# Patient Record
Sex: FEMALE | Race: WHITE | NOT HISPANIC OR LATINO | Employment: FULL TIME | URBAN - METROPOLITAN AREA
[De-identification: names, ages, dates, MRNs, and addresses within clinical notes are randomized per-mention and may not be internally consistent; named-entity substitution may affect disease eponyms.]

---
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
## 2025-02-13 ENCOUNTER — PHARMACY IMMUNIZATION REVIEW (OUTPATIENT)
Dept: PHARMACY | Facility: HOSPITAL | Age: 20
End: 2025-02-13
Payer: COMMERCIAL

## 2025-02-13 NOTE — PROGRESS NOTES
Caller: Annita Johnson    Relationship: Self    Best call back number: [phone]     What is the best time to reach you: ASAP    Who are you requesting to speak with (clinical staff, provider,  specific staff member):     What was the call regarding: PT SAID SHE WAS WORKING WITH CHRISTIANA ON THE [employer] APPLICATION.  SHE SAID SHE HAS NOT RECEIVED AN EMAIL BACK ON THAT YET LIKE CHRISTIANA MENTIONED SHE SHOULD BE RECEIVING.  PLEASE CALL PT TO ADVISE.       I have reviewed the notes, assessments, and/or procedures performed by Shira Howell, pharmacy technician, I concur with her documentation of Eladia Khan.

## 2025-02-13 NOTE — PROGRESS NOTES
[facility] Vaccination Administration    Patient reported to [facility] via referral on 2/13/2025    Allergies:    Patient has no known allergies.    Vaccine History:   Immunization History   Administered Date(s) Administered    COVID-19 (PFIZER) Purple Cap Monovalent 07/23/2021, 08/16/2021    Tdap 02/13/2025       Plan:    The following vaccines were administered today:  Tdap (patient is pregnant)    Maria Elena Howell  2/13/2025  11:56 EST

## 2025-04-01 LAB — EXTERNAL GROUP B STREP ANTIGEN: POSITIVE

## 2025-04-18 ENCOUNTER — HOSPITAL ENCOUNTER (INPATIENT)
Facility: HOSPITAL | Age: 20
LOS: 4 days | Discharge: HOME | End: 2025-04-22
Attending: OBSTETRICS & GYNECOLOGY | Admitting: OBSTETRICS & GYNECOLOGY
Payer: COMMERCIAL

## 2025-04-18 ENCOUNTER — HOSPITAL ENCOUNTER (OUTPATIENT)
Dept: LABOR AND DELIVERY | Facility: HOSPITAL | Age: 20
Discharge: HOME | End: 2025-04-18
Payer: COMMERCIAL

## 2025-04-18 PROBLEM — Z34.90 PREGNANCY: Status: ACTIVE | Noted: 2025-04-18

## 2025-04-18 LAB
ABO GROUP BLD: NORMAL
BASOPHILS # BLD AUTO: 0.02 10*3/MM3 (ref 0–0.2)
BASOPHILS NFR BLD AUTO: 0.2 % (ref 0–1.5)
BLD GP AB SCN SERPL QL: NEGATIVE
DEPRECATED RDW RBC AUTO: 44.1 FL (ref 37–54)
EOSINOPHIL # BLD AUTO: 0.03 10*3/MM3 (ref 0–0.4)
EOSINOPHIL NFR BLD AUTO: 0.2 % (ref 0.3–6.2)
ERYTHROCYTE [DISTWIDTH] IN BLOOD BY AUTOMATED COUNT: 12.7 % (ref 12.3–15.4)
HCT VFR BLD AUTO: 30.6 % (ref 34–46.6)
HGB BLD-MCNC: 10.4 G/DL (ref 12–15.9)
IMM GRANULOCYTES # BLD AUTO: 0.07 10*3/MM3 (ref 0–0.05)
IMM GRANULOCYTES NFR BLD AUTO: 0.6 % (ref 0–0.5)
LYMPHOCYTES # BLD AUTO: 1.93 10*3/MM3 (ref 0.7–3.1)
LYMPHOCYTES NFR BLD AUTO: 15.9 % (ref 19.6–45.3)
MCH RBC QN AUTO: 33.1 PG (ref 26.6–33)
MCHC RBC AUTO-ENTMCNC: 34 G/DL (ref 31.5–35.7)
MCV RBC AUTO: 97.5 FL (ref 79–97)
MONOCYTES # BLD AUTO: 0.73 10*3/MM3 (ref 0.1–0.9)
MONOCYTES NFR BLD AUTO: 6 % (ref 5–12)
NEUTROPHILS NFR BLD AUTO: 77.1 % (ref 42.7–76)
NEUTROPHILS NFR BLD AUTO: 9.37 10*3/MM3 (ref 1.7–7)
NRBC BLD AUTO-RTO: 0 /100 WBC (ref 0–0.2)
PLATELET # BLD AUTO: 182 10*3/MM3 (ref 140–450)
PMV BLD AUTO: 10.5 FL (ref 6–12)
RBC # BLD AUTO: 3.14 10*6/MM3 (ref 3.77–5.28)
RH BLD: POSITIVE
T&S EXPIRATION DATE: NORMAL
TREPONEMA PALLIDUM IGG+IGM AB [PRESENCE] IN SERUM OR PLASMA BY IMMUNOASSAY: NORMAL
WBC NRBC COR # BLD AUTO: 12.15 10*3/MM3 (ref 3.4–10.8)

## 2025-04-18 PROCEDURE — 86780 TREPONEMA PALLIDUM: CPT | Performed by: OBSTETRICS & GYNECOLOGY

## 2025-04-18 PROCEDURE — 86850 RBC ANTIBODY SCREEN: CPT | Performed by: OBSTETRICS & GYNECOLOGY

## 2025-04-18 PROCEDURE — 86900 BLOOD TYPING SEROLOGIC ABO: CPT | Performed by: OBSTETRICS & GYNECOLOGY

## 2025-04-18 PROCEDURE — 86901 BLOOD TYPING SEROLOGIC RH(D): CPT

## 2025-04-18 PROCEDURE — 25010000002 PENICILLIN G POTASSIUM PER 600000 UNITS: Performed by: OBSTETRICS & GYNECOLOGY

## 2025-04-18 PROCEDURE — 25810000003 LACTATED RINGERS PER 1000 ML: Performed by: OBSTETRICS & GYNECOLOGY

## 2025-04-18 PROCEDURE — 86901 BLOOD TYPING SEROLOGIC RH(D): CPT | Performed by: OBSTETRICS & GYNECOLOGY

## 2025-04-18 PROCEDURE — 85025 COMPLETE CBC W/AUTO DIFF WBC: CPT | Performed by: OBSTETRICS & GYNECOLOGY

## 2025-04-18 PROCEDURE — 86900 BLOOD TYPING SEROLOGIC ABO: CPT

## 2025-04-18 RX ORDER — SODIUM CHLORIDE, SODIUM LACTATE, POTASSIUM CHLORIDE, CALCIUM CHLORIDE 600; 310; 30; 20 MG/100ML; MG/100ML; MG/100ML; MG/100ML
75 INJECTION, SOLUTION INTRAVENOUS CONTINUOUS
Status: DISCONTINUED | OUTPATIENT
Start: 2025-04-18 | End: 2025-04-20

## 2025-04-18 RX ORDER — ACETAMINOPHEN 325 MG/1
650 TABLET ORAL EVERY 4 HOURS PRN
Status: DISCONTINUED | OUTPATIENT
Start: 2025-04-18 | End: 2025-04-20 | Stop reason: HOSPADM

## 2025-04-18 RX ORDER — LIDOCAINE HYDROCHLORIDE 10 MG/ML
30 INJECTION, SOLUTION EPIDURAL; INFILTRATION; INTRACAUDAL; PERINEURAL ONCE AS NEEDED
Status: DISCONTINUED | OUTPATIENT
Start: 2025-04-19 | End: 2025-04-20

## 2025-04-18 RX ORDER — ONDANSETRON 2 MG/ML
4 INJECTION INTRAMUSCULAR; INTRAVENOUS EVERY 6 HOURS PRN
Status: DISCONTINUED | OUTPATIENT
Start: 2025-04-18 | End: 2025-04-20 | Stop reason: HOSPADM

## 2025-04-18 RX ORDER — PRENATAL VIT/IRON FUM/FOLIC AC 27MG-0.8MG
1 TABLET ORAL DAILY
COMMUNITY

## 2025-04-18 RX ORDER — SODIUM CHLORIDE 0.9 % (FLUSH) 0.9 %
10 SYRINGE (ML) INJECTION AS NEEDED
Status: DISCONTINUED | OUTPATIENT
Start: 2025-04-18 | End: 2025-04-20 | Stop reason: HOSPADM

## 2025-04-18 RX ORDER — ONDANSETRON 4 MG/1
4 TABLET, ORALLY DISINTEGRATING ORAL EVERY 6 HOURS PRN
Status: DISCONTINUED | OUTPATIENT
Start: 2025-04-18 | End: 2025-04-20 | Stop reason: HOSPADM

## 2025-04-18 RX ORDER — FERROUS SULFATE 324(65)MG
324 TABLET, DELAYED RELEASE (ENTERIC COATED) ORAL
COMMUNITY

## 2025-04-18 RX ADMIN — SODIUM CHLORIDE, SODIUM LACTATE, POTASSIUM CHLORIDE, CALCIUM CHLORIDE 75 ML/HR: 20; 30; 600; 310 INJECTION, SOLUTION INTRAVENOUS at 17:17

## 2025-04-18 RX ADMIN — PENICILLIN G POTASSIUM 2.5 MILLION UNITS: 20000000 INJECTION, POWDER, FOR SOLUTION INTRAMUSCULAR; INTRAVENOUS at 23:41

## 2025-04-18 RX ADMIN — PENICILLIN G POTASSIUM 2.5 MILLION UNITS: 20000000 INJECTION, POWDER, FOR SOLUTION INTRAMUSCULAR; INTRAVENOUS at 20:01

## 2025-04-18 RX ADMIN — Medication 10 ML: at 19:59

## 2025-04-18 RX ADMIN — DINOPROSTONE 10 MG: 10 INSERT VAGINAL at 17:04

## 2025-04-18 RX ADMIN — SODIUM CHLORIDE 5 MILLION UNITS: 900 INJECTION INTRAVENOUS at 17:17

## 2025-04-18 NOTE — H&P
"[facility]  Obstetric History and Physical     Chief Complaint: Labor induction    Subjective     Patient is a 19 y.o. female  currently at 39 wks , who presents with scheduled labor induction.    Her prenatal care is benign.  Her previous obstetric/gynecological history is noted for is non-contributory.      Prenatal Information:  See office H&P for full details and labs.      Past OB History:       OB History    Para Term  AB Living   1 0 0 0 0 0   SAB IAB Ectopic Molar Multiple Live Births   0 0 0 0 0 0               Past Medical History: No past medical history on file.     Past Surgical History No past surgical history on file.     Family History: No family history on file.   Social History:  reports that she has never smoked. She has never used smokeless tobacco.   reports no history of alcohol use.   reports no history of drug use.        General ROS: Pertinent items are noted in HPI    Objective      Vitals:     Vitals:    25 1546   Weight: 63.3 kg (139 lb 8.8 oz)   Height: 157.5 cm (62\")       Fetal Heart Rate Assessment:   Category 1    [place]:   quiet     Physical Exam:     General Appearance:    Alert, cooperative, in no acute distress   Abdomen:     Soft, non-tender, no guarding, no rebound tenderness,       EFW 7#0oz - 7#8oz   Pelvic Exam:    Pelvis adequate.    Presentation: Vertex    Cervix: was checked (by me): closed cm / thick % / -2   Extremities:   Moves all extremities well, no edema, no cyanosis, no              redness   Skin:   No bleeding, bruising or rash   Neurologic:   No focal neurologic defect          Laboratory Results:   Lab Results (last 48 hours)       ** No results found for the last 48 hours. **               Assessment & Plan     Principal Problem:    Pregnancy         Assessment:  1.  Intrauterine pregnancy at 39 wks gestation.    2.  Risk reducing IOL  3.  GBS status:Positive    Plan:  1. Cervical ripening with Cervidil. and PCN for GBS.   2. Plan of care " has been reviewed with patient.  3.  Risks, benefits of treatment plan have been discussed.  4.  All questions have been answered.         Elizabeth Mendenhall MD   4/18/2025   15:52 EDT

## 2025-04-18 NOTE — PLAN OF CARE
Goal Outcome Evaluation:         Pt here for elective induction of labor. Cervidil placed at 1700, and penicillin for GBS+ status given at 1717. FHR WDL. VSS.

## 2025-04-19 ENCOUNTER — ANESTHESIA (OUTPATIENT)
Dept: LABOR AND DELIVERY | Facility: HOSPITAL | Age: 20
End: 2025-04-19
Payer: COMMERCIAL

## 2025-04-19 ENCOUNTER — ANESTHESIA EVENT (OUTPATIENT)
Dept: LABOR AND DELIVERY | Facility: HOSPITAL | Age: 20
End: 2025-04-19
Payer: COMMERCIAL

## 2025-04-19 PROCEDURE — 25810000003 LACTATED RINGERS PER 1000 ML: Performed by: OBSTETRICS & GYNECOLOGY

## 2025-04-19 PROCEDURE — C1755 CATHETER, INTRASPINAL: HCPCS | Performed by: ANESTHESIOLOGY

## 2025-04-19 PROCEDURE — 25010000002 FENTANYL CITRATE (PF) 100 MCG/2ML SOLUTION: Performed by: ANESTHESIOLOGY

## 2025-04-19 PROCEDURE — 25010000002 MORPHINE PER 10 MG: Performed by: OBSTETRICS & GYNECOLOGY

## 2025-04-19 PROCEDURE — 3E033VJ INTRODUCTION OF OTHER HORMONE INTO PERIPHERAL VEIN, PERCUTANEOUS APPROACH: ICD-10-PCS | Performed by: OBSTETRICS & GYNECOLOGY

## 2025-04-19 PROCEDURE — 25010000002 ONDANSETRON PER 1 MG: Performed by: OBSTETRICS & GYNECOLOGY

## 2025-04-19 PROCEDURE — 25010000002 PENICILLIN G POTASSIUM PER 600000 UNITS: Performed by: OBSTETRICS & GYNECOLOGY

## 2025-04-19 PROCEDURE — 25010000002 LIDOCAINE-EPINEPHRINE (PF) 2 %-1:200000 SOLUTION: Performed by: ANESTHESIOLOGY

## 2025-04-19 PROCEDURE — 10907ZC DRAINAGE OF AMNIOTIC FLUID, THERAPEUTIC FROM PRODUCTS OF CONCEPTION, VIA NATURAL OR ARTIFICIAL OPENING: ICD-10-PCS | Performed by: OBSTETRICS & GYNECOLOGY

## 2025-04-19 RX ORDER — LIDOCAINE HCL/EPINEPHRINE/PF 2%-1:200K
VIAL (ML) INJECTION AS NEEDED
Status: DISCONTINUED | OUTPATIENT
Start: 2025-04-19 | End: 2025-04-20 | Stop reason: SURG

## 2025-04-19 RX ORDER — FENTANYL/BUPIVACAINE/NS/PF 2-1250MCG
PLASTIC BAG, INJECTION (ML) INJECTION CONTINUOUS
Refills: 0 | Status: DISCONTINUED | OUTPATIENT
Start: 2025-04-19 | End: 2025-04-20

## 2025-04-19 RX ORDER — OXYTOCIN/0.9 % SODIUM CHLORIDE 30/500 ML
2 PLASTIC BAG, INJECTION (ML) INTRAVENOUS
Status: DISCONTINUED | OUTPATIENT
Start: 2025-04-19 | End: 2025-04-20

## 2025-04-19 RX ORDER — LIDOCAINE HYDROCHLORIDE AND EPINEPHRINE 20; 5 MG/ML; UG/ML
INJECTION, SOLUTION EPIDURAL; INFILTRATION; INTRACAUDAL; PERINEURAL
Status: COMPLETED
Start: 2025-04-19 | End: 2025-04-19

## 2025-04-19 RX ORDER — FENTANYL CITRATE 50 UG/ML
INJECTION, SOLUTION INTRAMUSCULAR; INTRAVENOUS
Status: COMPLETED
Start: 2025-04-19 | End: 2025-04-19

## 2025-04-19 RX ORDER — MISOPROSTOL 100 MCG
25 TABLET ORAL ONCE
Status: COMPLETED | OUTPATIENT
Start: 2025-04-19 | End: 2025-04-19

## 2025-04-19 RX ORDER — FENTANYL CITRATE 50 UG/ML
INJECTION, SOLUTION INTRAMUSCULAR; INTRAVENOUS AS NEEDED
Status: DISCONTINUED | OUTPATIENT
Start: 2025-04-19 | End: 2025-04-20 | Stop reason: SURG

## 2025-04-19 RX ORDER — EPHEDRINE SULFATE/0.9% NACL/PF 25 MG/5 ML
10 SYRINGE (ML) INTRAVENOUS
Status: DISCONTINUED | OUTPATIENT
Start: 2025-04-19 | End: 2025-04-20 | Stop reason: HOSPADM

## 2025-04-19 RX ADMIN — MORPHINE SULFATE 4 MG: 4 INJECTION, SOLUTION INTRAMUSCULAR; INTRAVENOUS at 00:03

## 2025-04-19 RX ADMIN — SODIUM CHLORIDE, SODIUM LACTATE, POTASSIUM CHLORIDE, CALCIUM CHLORIDE 75 ML/HR: 20; 30; 600; 310 INJECTION, SOLUTION INTRAVENOUS at 16:14

## 2025-04-19 RX ADMIN — PENICILLIN G POTASSIUM 2.5 MILLION UNITS: 20000000 INJECTION, POWDER, FOR SOLUTION INTRAMUSCULAR; INTRAVENOUS at 04:34

## 2025-04-19 RX ADMIN — PENICILLIN G POTASSIUM 2.5 MILLION UNITS: 20000000 INJECTION, POWDER, FOR SOLUTION INTRAMUSCULAR; INTRAVENOUS at 07:57

## 2025-04-19 RX ADMIN — Medication 25 MCG: at 07:57

## 2025-04-19 RX ADMIN — Medication 2 MILLI-UNITS/MIN: at 11:43

## 2025-04-19 RX ADMIN — Medication 10 ML/HR: at 16:20

## 2025-04-19 RX ADMIN — PENICILLIN G POTASSIUM 2.5 MILLION UNITS: 20000000 INJECTION, POWDER, FOR SOLUTION INTRAMUSCULAR; INTRAVENOUS at 11:43

## 2025-04-19 RX ADMIN — PENICILLIN G POTASSIUM 2.5 MILLION UNITS: 20000000 INJECTION, POWDER, FOR SOLUTION INTRAMUSCULAR; INTRAVENOUS at 20:04

## 2025-04-19 RX ADMIN — PENICILLIN G POTASSIUM 2.5 MILLION UNITS: 20000000 INJECTION, POWDER, FOR SOLUTION INTRAMUSCULAR; INTRAVENOUS at 16:16

## 2025-04-19 RX ADMIN — Medication: at 23:01

## 2025-04-19 RX ADMIN — ONDANSETRON 4 MG: 2 INJECTION, SOLUTION INTRAMUSCULAR; INTRAVENOUS at 22:16

## 2025-04-19 RX ADMIN — FENTANYL CITRATE 100 MCG: 50 INJECTION, SOLUTION INTRAMUSCULAR; INTRAVENOUS at 16:15

## 2025-04-19 RX ADMIN — LIDOCAINE HYDROCHLORIDE AND EPINEPHRINE 2.5 ML: 20; 5 INJECTION, SOLUTION EPIDURAL; INFILTRATION; INTRACAUDAL; PERINEURAL at 16:10

## 2025-04-19 NOTE — PLAN OF CARE
Goal Outcome Evaluation:                         Pt progressing through labor. AROM at 1110; clear fluid. Pitocin started at 1143. Epidural placed at 1557, and raza catheter placed with good output. Pt states pain is controlled. VSS. PCN given for GBS+ protocol.

## 2025-04-19 NOTE — PROGRESS NOTES
" [facility]  Obstetric Progress Note    Subjective   No complaints.     Objective     Vitals:  Vitals:    04/19/25 0300 04/19/25 0400 04/19/25 0500 04/19/25 0805   BP: 108/64 106/52 115/72 116/71   BP Location:    Left arm   Patient Position:    Lying   Pulse: 85 83 93 93   Resp:    16   Temp:  97.7 °F (36.5 °C)  97.4 °F (36.3 °C)   TempSrc:  Oral  Oral   SpO2: 96% 95% 97% 97%   Weight:       Height:         Flowsheet Rows      Flowsheet Row First Filed Value   Admission Height 157.5 cm (62\") Documented at 04/18/2025 1546   Admission Weight 63.3 kg (139 lb 8.8 oz) Documented at 04/18/2025 1546          No intake or output data in the 24 hours ending 04/19/25 1114    Fetal Heart Rate Assessment:   Category 1  [place]:  q 3    Physical Exam:  General: Patient is in no acute distress    Pelvic Exam: was checked (by me): 1 cm / 75% % / -2, AROM- Clear, and IUPC placed without difficulty            Assessment & Plan     Principal Problem:    Pregnancy         Assessment:  1.  Intrauterine pregnancy at 39w3d gestation.    2.  Risk reducing IOL  3.  GBS status: Positive    Plan:  1. Pitocin induction. and PCN for GBS.   2. Plan of care has been reviewed with patient.  3.  Risks, benefits of treatment plan have been discussed.  4.  All questions have been answered.        Elizabeth Mendenhall MD  4/19/2025  11:14 EDT    "

## 2025-04-19 NOTE — ANESTHESIA PROCEDURE NOTES
Labor Epidural      Patient location during procedure: OB  Start Time: 4/19/2025 4:05 PM  Stop Time: 4/19/2025 4:20 PM  Indication:at surgeon's request  Performed By  Anesthesiologist: Mehrdad Singh MD  Preanesthetic Checklist  Completed: patient identified, IV checked, site marked, risks and benefits discussed, surgical consent, monitors and equipment checked, pre-op evaluation and timeout performed  Additional Notes  SITTING BACK PREP SKIN WHEAL L34 IS W TUOHY EPI SPACE VIA LINDA X1 ATTEMPT NO BLOOD CSF OR PARESTHESIA CATH ADV 3CM NEG ASP TEST NEG DOSED INCREMENTALLY Q3-5MIN WITH NEG ASP PRIOR TO EACH  Prep:  Pt Position:sitting  Sterile Tech:gloves, cap, sterile barrier and mask  Prep:chlorhexidine gluconate and isopropyl alcohol  Monitoring:continuous pulse oximetry, EKG and blood pressure monitoring  Epidural Block Procedure:  Approach:midline  Guidance:landmark technique  Location:L3-L4  Needle Type:Tuohy  Needle Gauge:17 G  Loss of Resistance Medium: saline  Loss of Resistance: 4cm  Cath Depth at skin:7 cm  Paresthesia: none  Aspiration:negative  Test Dose:negative  Number of Attempts: 1  Post Assessment:  Dressing:secured with tape and occlusive dressing applied  Pt Tolerance:patient tolerated the procedure well with no apparent complications  Complications:no

## 2025-04-19 NOTE — ANESTHESIA PREPROCEDURE EVALUATION
Anesthesia Evaluation     NPO Solid Status: > 8 hours  NPO Liquid Status: > 8 hours           Airway   Mallampati: I  TM distance: >3 FB  Neck ROM: full  No difficulty expected  Dental - normal exam     Pulmonary - normal exam   Cardiovascular - normal exam        Neuro/Psych  GI/Hepatic/Renal/Endo      Musculoskeletal     Abdominal  - normal exam    Bowel sounds: normal.   Substance History      OB/GYN    (+) Pregnant        Other                    Anesthesia Plan    ASA 2     epidural       Anesthetic plan, risks, benefits, and alternatives have been provided, discussed and informed consent has been obtained with: patient.  Pre-procedure education provided    CODE STATUS:    Code Status (Patient has no pulse and is not breathing): CPR (Attempt to Resuscitate)  Medical Interventions (Patient has pulse or is breathing): Full Support  Level Of Support Discussed With: Patient

## 2025-04-19 NOTE — PLAN OF CARE
Goal Outcome Evaluation:      Pt maria del carmen regularly on cervidil throughout the night. Pt requested morphine x1 for pain. Cervidil removed @ 0505.

## 2025-04-20 PROBLEM — Z34.90 PREGNANCY: Status: RESOLVED | Noted: 2025-04-18 | Resolved: 2025-04-20

## 2025-04-20 PROCEDURE — 25010000002 PENICILLIN G POTASSIUM PER 600000 UNITS: Performed by: OBSTETRICS & GYNECOLOGY

## 2025-04-20 PROCEDURE — 0KQM0ZZ REPAIR PERINEUM MUSCLE, OPEN APPROACH: ICD-10-PCS | Performed by: OBSTETRICS & GYNECOLOGY

## 2025-04-20 RX ORDER — MISOPROSTOL 200 UG/1
800 TABLET ORAL ONCE AS NEEDED
Status: DISCONTINUED | OUTPATIENT
Start: 2025-04-20 | End: 2025-04-20 | Stop reason: HOSPADM

## 2025-04-20 RX ORDER — ONDANSETRON 4 MG/1
4 TABLET, ORALLY DISINTEGRATING ORAL EVERY 8 HOURS PRN
Status: DISCONTINUED | OUTPATIENT
Start: 2025-04-20 | End: 2025-04-22 | Stop reason: HOSPADM

## 2025-04-20 RX ORDER — ACETAMINOPHEN 325 MG/1
650 TABLET ORAL EVERY 6 HOURS PRN
Status: DISCONTINUED | OUTPATIENT
Start: 2025-04-20 | End: 2025-04-22 | Stop reason: HOSPADM

## 2025-04-20 RX ORDER — OXYTOCIN/0.9 % SODIUM CHLORIDE 30/500 ML
999 PLASTIC BAG, INJECTION (ML) INTRAVENOUS ONCE
Status: COMPLETED | OUTPATIENT
Start: 2025-04-20 | End: 2025-04-20

## 2025-04-20 RX ORDER — IBUPROFEN 600 MG/1
600 TABLET, FILM COATED ORAL EVERY 6 HOURS PRN
Status: DISCONTINUED | OUTPATIENT
Start: 2025-04-20 | End: 2025-04-22 | Stop reason: HOSPADM

## 2025-04-20 RX ORDER — CARBOPROST TROMETHAMINE 250 UG/ML
250 INJECTION, SOLUTION INTRAMUSCULAR
Status: DISCONTINUED | OUTPATIENT
Start: 2025-04-20 | End: 2025-04-20 | Stop reason: HOSPADM

## 2025-04-20 RX ORDER — FERROUS SULFATE 325(65) MG
325 TABLET ORAL
Status: DISCONTINUED | OUTPATIENT
Start: 2025-04-21 | End: 2025-04-22 | Stop reason: HOSPADM

## 2025-04-20 RX ORDER — DOCUSATE SODIUM 100 MG/1
100 CAPSULE, LIQUID FILLED ORAL 2 TIMES DAILY
Status: DISCONTINUED | OUTPATIENT
Start: 2025-04-20 | End: 2025-04-22 | Stop reason: HOSPADM

## 2025-04-20 RX ORDER — HYDROCORTISONE ACETATE PRAMOXINE HCL 2.5; 1 G/100G; G/100G
1 CREAM TOPICAL AS NEEDED
Status: DISCONTINUED | OUTPATIENT
Start: 2025-04-20 | End: 2025-04-22 | Stop reason: HOSPADM

## 2025-04-20 RX ORDER — BISACODYL 10 MG
10 SUPPOSITORY, RECTAL RECTAL DAILY PRN
Status: DISCONTINUED | OUTPATIENT
Start: 2025-04-21 | End: 2025-04-22 | Stop reason: HOSPADM

## 2025-04-20 RX ORDER — OXYTOCIN/0.9 % SODIUM CHLORIDE 30/500 ML
250 PLASTIC BAG, INJECTION (ML) INTRAVENOUS CONTINUOUS
Status: ACTIVE | OUTPATIENT
Start: 2025-04-20 | End: 2025-04-20

## 2025-04-20 RX ORDER — METHYLERGONOVINE MALEATE 0.2 MG/ML
200 INJECTION INTRAVENOUS ONCE AS NEEDED
Status: DISCONTINUED | OUTPATIENT
Start: 2025-04-20 | End: 2025-04-20 | Stop reason: HOSPADM

## 2025-04-20 RX ORDER — PRENATAL VIT/IRON FUM/FOLIC AC 27MG-0.8MG
1 TABLET ORAL DAILY
Status: DISCONTINUED | OUTPATIENT
Start: 2025-04-20 | End: 2025-04-22 | Stop reason: HOSPADM

## 2025-04-20 RX ORDER — OXYTOCIN/0.9 % SODIUM CHLORIDE 30/500 ML
125 PLASTIC BAG, INJECTION (ML) INTRAVENOUS CONTINUOUS PRN
Status: DISCONTINUED | OUTPATIENT
Start: 2025-04-20 | End: 2025-04-22 | Stop reason: HOSPADM

## 2025-04-20 RX ORDER — SODIUM CHLORIDE 0.9 % (FLUSH) 0.9 %
1-10 SYRINGE (ML) INJECTION AS NEEDED
Status: DISCONTINUED | OUTPATIENT
Start: 2025-04-20 | End: 2025-04-22 | Stop reason: HOSPADM

## 2025-04-20 RX ADMIN — Medication: at 07:54

## 2025-04-20 RX ADMIN — Medication 999 ML/HR: at 09:40

## 2025-04-20 RX ADMIN — IBUPROFEN 600 MG: 600 TABLET ORAL at 20:07

## 2025-04-20 RX ADMIN — PENICILLIN G POTASSIUM 2.5 MILLION UNITS: 20000000 INJECTION, POWDER, FOR SOLUTION INTRAMUSCULAR; INTRAVENOUS at 07:55

## 2025-04-20 RX ADMIN — PENICILLIN G POTASSIUM 2.5 MILLION UNITS: 20000000 INJECTION, POWDER, FOR SOLUTION INTRAMUSCULAR; INTRAVENOUS at 04:06

## 2025-04-20 RX ADMIN — PENICILLIN G POTASSIUM 2.5 MILLION UNITS: 20000000 INJECTION, POWDER, FOR SOLUTION INTRAMUSCULAR; INTRAVENOUS at 00:06

## 2025-04-20 NOTE — ANESTHESIA POSTPROCEDURE EVALUATION
Patient: Eladia Khan    Procedure Summary       Date: 04/19/25 Room / Location:     Anesthesia Start: 1605 Anesthesia Stop: 04/20/25 0936    Procedure: LABOR ANALGESIA Diagnosis:     Scheduled Providers:  Provider: Mehrdad Singh MD    Anesthesia Type: epidural ASA Status: 2            Anesthesia Type: epidural    Vitals  Vitals Value Taken Time   /74 04/20/25 10:30   Temp 98.7 °F (37.1 °C) 04/20/25 06:00   Pulse 104 04/20/25 10:30   Resp 18 04/20/25 00:00   SpO2 98 % 04/20/25 10:30   Vitals shown include unfiled device data.        Post Anesthesia Care and Evaluation    Patient location during evaluation: PACU  Patient participation: complete - patient participated  Level of consciousness: awake  Pain scale: See nurse's notes for pain score.  Pain management: adequate    Airway patency: patent  Anesthetic complications: No anesthetic complications  PONV Status: none  Cardiovascular status: acceptable  Respiratory status: acceptable and spontaneous ventilation  Hydration status: acceptable    Comments: Patient seen and examined postoperatively; vital signs stable; SpO2 greater than or equal to 90%; cardiopulmonary status stable; nausea/vomiting adequately controlled; pain adequately controlled; no apparent anesthesia complications; patient discharged from anesthesia care when discharge criteria were met

## 2025-04-20 NOTE — PLAN OF CARE
Spontaenous vaginal delivery with 2nd degree tear. Mom and infant doing well. Mom denies pain. Voided spontaneously.    Problem: Adult Inpatient Plan of Care  Goal: Plan of Care Review  Outcome: Met  Flowsheets (Taken 4/20/2025 1225)  Plan of Care Reviewed With:   patient   parent  Goal: Patient-Specific Goal (Individualized)  Outcome: Met  Goal: Absence of Hospital-Acquired Illness or Injury  Outcome: Met  Intervention: Identify and Manage Fall Risk  Recent Flowsheet Documentation  Taken 4/20/2025 0700 by Dean Castro RN  Safety Promotion/Fall Prevention:   safety round/check completed   clutter free environment maintained  Intervention: Prevent Skin Injury  Recent Flowsheet Documentation  Taken 4/20/2025 1145 by Dean Castro RN  Body Position: sitting up in bed  Taken 4/20/2025 1130 by Dean Castro RN  Body Position: sitting up in bed  Taken 4/20/2025 1115 by Dean Castro RN  Body Position: sitting up in bed  Taken 4/20/2025 1100 by Dean Castro RN  Body Position: sitting up in bed  Taken 4/20/2025 1045 by Dean Castro RN  Body Position: sitting up in bed  Taken 4/20/2025 1030 by Dean Castro RN  Body Position: sitting up in bed  Taken 4/20/2025 1015 by Dean Castro RN  Body Position: sitting up in bed  Taken 4/20/2025 1000 by Dean Castro RN  Body Position: sitting up in bed  Taken 4/20/2025 0945 by Dean Castro RN  Body Position: position maintained  Taken 4/20/2025 0700 by Dean Castro RN  Body Position: sitting up in bed  Intervention: Prevent Infection  Recent Flowsheet Documentation  Taken 4/20/2025 1130 by Dean Castro RN  Infection Prevention:   single patient room provided   personal protective equipment utilized   hand hygiene promoted   cohorting utilized   rest/sleep promoted  Taken 4/20/2025 1115 by Dean Castro RN  Infection Prevention:   single patient room provided   personal protective equipment utilized   hand hygiene promoted   cohorting  utilized   rest/sleep promoted  Taken 4/20/2025 1100 by Dean Castro RN  Infection Prevention:   single patient room provided   personal protective equipment utilized   hand hygiene promoted   cohorting utilized   rest/sleep promoted  Taken 4/20/2025 1045 by Dean Castro RN  Infection Prevention:   single patient room provided   personal protective equipment utilized   hand hygiene promoted   cohorting utilized   rest/sleep promoted  Taken 4/20/2025 1030 by Dean Castro RN  Infection Prevention:   single patient room provided   personal protective equipment utilized   hand hygiene promoted   cohorting utilized   rest/sleep promoted  Taken 4/20/2025 1015 by Dean Castro RN  Infection Prevention:   single patient room provided   personal protective equipment utilized   hand hygiene promoted   cohorting utilized   rest/sleep promoted  Taken 4/20/2025 1000 by Dean Castro RN  Infection Prevention:   single patient room provided   personal protective equipment utilized   hand hygiene promoted   cohorting utilized   rest/sleep promoted  Taken 4/20/2025 0945 by Dean Castro RN  Infection Prevention:   single patient room provided   hand hygiene promoted  Taken 4/20/2025 0700 by Dean Castro RN  Infection Prevention:   single patient room provided   rest/sleep promoted   hand hygiene promoted  Goal: Optimal Comfort and Wellbeing  Outcome: Met  Intervention: Monitor Pain and Promote Comfort  Recent Flowsheet Documentation  Taken 4/20/2025 0945 by Dean Castro RN  Pain Management Interventions:   pain management plan reviewed with patient/caregiver   medication offered but refused   care clustered   pillow support provided  Intervention: Provide Person-Centered Care  Recent Flowsheet Documentation  Taken 4/20/2025 1145 by Dean Castro RN  Trust Relationship/Rapport: care explained  Taken 4/20/2025 1130 by Dean Castro RN  Trust Relationship/Rapport: care explained  Taken 4/20/2025 1115  by Dean Castro RN  Trust Relationship/Rapport: care explained  Taken 4/20/2025 1100 by Dean Castro RN  Trust Relationship/Rapport: care explained  Taken 4/20/2025 1045 by Dean Castro RN  Trust Relationship/Rapport: care explained  Taken 4/20/2025 1030 by Dean Castro RN  Trust Relationship/Rapport: care explained  Taken 4/20/2025 1015 by Dean Castro RN  Trust Relationship/Rapport: care explained  Taken 4/20/2025 1000 by Dean Castro RN  Trust Relationship/Rapport: care explained  Taken 4/20/2025 0945 by Dean Castro RN  Trust Relationship/Rapport:   care explained   choices provided   emotional support provided   empathic listening provided   questions answered   questions encouraged   thoughts/feelings acknowledged   reassurance provided  Taken 4/20/2025 0700 by Dean Castro RN  Trust Relationship/Rapport:   care explained   choices provided   emotional support provided   questions answered   questions encouraged   empathic listening provided   thoughts/feelings acknowledged   reassurance provided  Goal: Readiness for Transition of Care  Outcome: Met     Problem: Labor  Goal: Hemostasis  Outcome: Met  Goal: Stable Fetal Wellbeing  Outcome: Met  Intervention: Promote and Monitor Fetal Wellbeing  Recent Flowsheet Documentation  Taken 4/20/2025 1145 by Dean Castro RN  Body Position: sitting up in bed  Taken 4/20/2025 1130 by Dean Castro RN  Body Position: sitting up in bed  Taken 4/20/2025 1115 by Dean Castro RN  Body Position: sitting up in bed  Taken 4/20/2025 1100 by Dean Castro RN  Body Position: sitting up in bed  Taken 4/20/2025 1045 by Dean Castro RN  Body Position: sitting up in bed  Taken 4/20/2025 1030 by Dean Castro RN  Body Position: sitting up in bed  Taken 4/20/2025 1015 by Dean Castro RN  Body Position: sitting up in bed  Taken 4/20/2025 1000 by Dean Castro RN  Body Position: sitting up in bed  Taken 4/20/2025 0945 by Matthew  KALEY Moran  Body Position: position maintained  Taken 4/20/2025 0700 by Dean Castro RN  Body Position: sitting up in bed  Goal: Effective Progression to Delivery  Outcome: Met  Goal: Absence of Infection Signs and Symptoms  Outcome: Met  Intervention: Prevent or Manage Infection  Recent Flowsheet Documentation  Taken 4/20/2025 1130 by Dean Castro RN  Infection Prevention:   single patient room provided   personal protective equipment utilized   hand hygiene promoted   cohorting utilized   rest/sleep promoted  Taken 4/20/2025 1115 by Dean Castro RN  Infection Prevention:   single patient room provided   personal protective equipment utilized   hand hygiene promoted   cohorting utilized   rest/sleep promoted  Taken 4/20/2025 1100 by Dean Castro RN  Infection Prevention:   single patient room provided   personal protective equipment utilized   hand hygiene promoted   cohorting utilized   rest/sleep promoted  Taken 4/20/2025 1045 by Dean Castro RN  Infection Prevention:   single patient room provided   personal protective equipment utilized   hand hygiene promoted   cohorting utilized   rest/sleep promoted  Taken 4/20/2025 1030 by Dean Castro RN  Infection Prevention:   single patient room provided   personal protective equipment utilized   hand hygiene promoted   cohorting utilized   rest/sleep promoted  Taken 4/20/2025 1015 by Dean Castro RN  Infection Prevention:   single patient room provided   personal protective equipment utilized   hand hygiene promoted   cohorting utilized   rest/sleep promoted  Taken 4/20/2025 1000 by Dean Castro RN  Infection Prevention:   single patient room provided   personal protective equipment utilized   hand hygiene promoted   cohorting utilized   rest/sleep promoted  Taken 4/20/2025 0945 by Dean Castro RN  Infection Prevention:   single patient room provided   hand hygiene promoted  Taken 4/20/2025 0700 by Dean Castro RN  Infection  Prevention:   single patient room provided   rest/sleep promoted   hand hygiene promoted  Goal: Acceptable Pain Control  Outcome: Met  Goal: Normal Uterine Contraction Pattern  Outcome: Met     Problem: Skin Injury Risk Increased  Goal: Skin Health and Integrity  Outcome: Met  Intervention: Optimize Skin Protection  Recent Flowsheet Documentation  Taken 4/20/2025 1145 by Dean Castro, RN  Activity Management: ambulated to bathroom  Taken 4/20/2025 1000 by Dean Castro, RN  Activity Management: sitting, edge of bed  Taken 4/20/2025 0945 by Dean Castro, RN  Activity Management: bedrest  Head of Bed (HOB) Positioning: HOB at 45 degrees   Goal Outcome Evaluation:  Plan of Care Reviewed With: patient, parent

## 2025-04-20 NOTE — L&D DELIVERY NOTE
[facility]  Vaginal Delivery Note    Diagnosis     Patient is a 19 y.o. female  currently at 39w4d, who presents with induction of labor.      Delivery     Delivery:  Spontaneous Vaginal Delivery    Date of Delivery:  2025   Anesthesia:  Epidural   Delivering clinician: Elizabeth Mendenhall MD      Delivery narrative:  Pt presented for IOL at term.  She was induced c cervidil and then amniotomy and pitocin.  She progressed slowly to active labor but once in active labor she progressed on a normal labor curve to complete.  She had a category 1 tracing throughout.  She pushed with excellent effort for just about 90 minutes (contractions were at times 5-6 minutes apart) to deliver a vigorous infant without difficulty.     Infant    Findings: VFI     Apgars: 9 & 9 at 1 and 5 minutes.      Placenta, Cord, and Fluid     Delayed cord clamping performed per routine.       Placenta delivered spontaneous, intact  3VC      Bimanual massage and Pitocin 30 units in 500 mL bolus given after placental delivery.  There was good hemostasis and a firm uterine tone.        Lacerations       had a 2nd degree laceration, repaired c 3.0 vicryl rapide in the usual fashion.    Quantitiative Blood Loss  300  mL     Sponge and needle counts correct x 2.     Disposition  Mother to Mother Baby/Postpartum  in stable condition currently.  Baby to remains with mom  in stable condition currently.      Elizabeth Mendenhall MD  25  10:23 EDT

## 2025-04-20 NOTE — PROGRESS NOTES
"[facility]  Obstetric Progress Note    Subjective   Comfortable c epidural.     Objective     Vitals:  Vitals:    04/19/25 1905 04/19/25 1930 04/19/25 2000 04/19/25 2100   BP: 101/58 112/63 113/71 112/59   BP Location:   Left arm Left arm   Patient Position:   Lying Lying   Pulse: 102 80 81 118   Resp:   18    Temp:   98.4 °F (36.9 °C)    TempSrc:   Oral    SpO2: 100% 99% 98% 99%   Weight:       Height:         Flowsheet Rows      Flowsheet Row First Filed Value   Admission Height 157.5 cm (62\") Documented at 04/18/2025 1546   Admission Weight 63.3 kg (139 lb 8.8 oz) Documented at 04/18/2025 1546            Intake/Output Summary (Last 24 hours) at 4/19/2025 2110  Last data filed at 4/19/2025 1722  Gross per 24 hour   Intake --   Output 350 ml   Net -350 ml       Fetal Heart Rate Assessment:   Category 1  [place]:  q 3    Physical Exam:  General: Patient is in no acute distress    Pelvic Exam: was checked (by me): 4 cm / 90% % / -1            Assessment & Plan     Principal Problem:    Pregnancy         Assessment:  1.  Intrauterine pregnancy at 39w3d gestation.    2.  Risk reducing IOL  3.  GBS status: Positive    Plan:  1. Pitocin induction. and PCN for GBS.   2. Plan of care has been reviewed with patient.  3.  Risks, benefits of treatment plan have been discussed.  4.  All questions have been answered.        Elizabeth Mendenhall MD  4/19/2025  21:10 EDT    "

## 2025-04-21 LAB
ANISOCYTOSIS BLD QL: NORMAL
BASOPHILS # BLD AUTO: 0.05 10*3/MM3 (ref 0–0.2)
BASOPHILS NFR BLD AUTO: 0.3 % (ref 0–1.5)
DEPRECATED RDW RBC AUTO: 52.8 FL (ref 37–54)
EOSINOPHIL # BLD AUTO: 0.09 10*3/MM3 (ref 0–0.4)
EOSINOPHIL NFR BLD AUTO: 0.5 % (ref 0.3–6.2)
ERYTHROCYTE [DISTWIDTH] IN BLOOD BY AUTOMATED COUNT: 13.2 % (ref 12.3–15.4)
HCT VFR BLD AUTO: 29.4 % (ref 34–46.6)
HGB BLD-MCNC: 8.9 G/DL (ref 12–15.9)
IMM GRANULOCYTES # BLD AUTO: 0.09 10*3/MM3 (ref 0–0.05)
IMM GRANULOCYTES NFR BLD AUTO: 0.5 % (ref 0–0.5)
LARGE PLATELETS: NORMAL
LYMPHOCYTES # BLD AUTO: 1.97 10*3/MM3 (ref 0.7–3.1)
LYMPHOCYTES NFR BLD AUTO: 11.2 % (ref 19.6–45.3)
MACROCYTES BLD QL SMEAR: NORMAL
MCH RBC QN AUTO: 33.1 PG (ref 26.6–33)
MCHC RBC AUTO-ENTMCNC: 30.3 G/DL (ref 31.5–35.7)
MCV RBC AUTO: 109.3 FL (ref 79–97)
MONOCYTES # BLD AUTO: 1.08 10*3/MM3 (ref 0.1–0.9)
MONOCYTES NFR BLD AUTO: 6.2 % (ref 5–12)
NEUTROPHILS NFR BLD AUTO: 14.24 10*3/MM3 (ref 1.7–7)
NEUTROPHILS NFR BLD AUTO: 81.3 % (ref 42.7–76)
NRBC BLD AUTO-RTO: 0 /100 WBC (ref 0–0.2)
PLATELET # BLD AUTO: 144 10*3/MM3 (ref 140–450)
PMV BLD AUTO: 10.6 FL (ref 6–12)
RBC # BLD AUTO: 2.69 10*6/MM3 (ref 3.77–5.28)
SMALL PLATELETS BLD QL SMEAR: ADEQUATE
WBC MORPH BLD: NORMAL
WBC NRBC COR # BLD AUTO: 17.52 10*3/MM3 (ref 3.4–10.8)

## 2025-04-21 PROCEDURE — 97161 PT EVAL LOW COMPLEX 20 MIN: CPT | Performed by: PHYSICAL THERAPIST

## 2025-04-21 PROCEDURE — 85025 COMPLETE CBC W/AUTO DIFF WBC: CPT | Performed by: OBSTETRICS & GYNECOLOGY

## 2025-04-21 PROCEDURE — 85007 BL SMEAR W/DIFF WBC COUNT: CPT | Performed by: OBSTETRICS & GYNECOLOGY

## 2025-04-21 RX ADMIN — DOCUSATE SODIUM 100 MG: 100 CAPSULE, LIQUID FILLED ORAL at 09:36

## 2025-04-21 RX ADMIN — PRENATAL VITAMINS-IRON FUMARATE 27 MG IRON-FOLIC ACID 0.8 MG TABLET 1 TABLET: at 09:36

## 2025-04-21 RX ADMIN — IBUPROFEN 600 MG: 600 TABLET ORAL at 09:36

## 2025-04-21 RX ADMIN — FERROUS SULFATE TAB 325 MG (65 MG ELEMENTAL FE) 325 MG: 325 (65 FE) TAB at 09:36

## 2025-04-21 NOTE — PROGRESS NOTES
[facility]  Postpartum Note    Subjective   Postpartum Day 1:  Spontaneous Vaginal Delivery    Patient without complaints. Her pain is well controlled with nonsteroidal anti-inflammatory drugs and prescribed pain medications. She is ambulating well.  Patient describes her bleeding as thin lochia. Pt voiding /s difficulty. Pt denies headache, visual changes, or RUQ pain.     Breastfeeding: infant latching without difficulty.    Objective     Vitals:  Vitals:    25 1608 25 1930 25 2250 25 0742   BP: 120/77 112/70 109/68 101/64   BP Location:  Right arm Right arm Left arm   Patient Position:  Sitting Sitting Sitting   Pulse: 88 86 100 78   Resp: 16 18 18 17   Temp: 97.8 °F (36.6 °C) 98.3 °F (36.8 °C) 98.1 °F (36.7 °C) 97.5 °F (36.4 °C)   TempSrc:  Oral Oral Oral   SpO2: 98% 98% 95% 99%   Weight:   59.1 kg (130 lb 3.2 oz)    Height:           Physical Exam:  General:  Alert and oriented x3. No acute distress.  Abdomen: abdomen is soft without significant tenderness, masses, organomegaly or guarding. Fundus: appropriate, firm, non tender  Incision: N/A  Skin: Warm, Dry  Extremities: Normal,  trace edema. Nontender     Labs:  Results from last 7 days   Lab Units 25  0450 25  1614   WBC 10*3/mm3 17.52* 12.15*   HEMOGLOBIN g/dL 8.9* 10.4*   HEMATOCRIT % 29.4* 30.6*   PLATELETS 10*3/mm3 144 182            Feeding method: Breastfeeding Status: Yes     Blood Type: RH Positive        Assessment & Plan     Active Problems:     (normal spontaneous vaginal delivery)  Anemia; Hgb 10.4 to 8.9    Eladia Khan is Day 1  post-partum from a  Spontaneous Vaginal Delivery      Plan:  routine, continue present management, and plan d/c tomorrow. FeSO4 bid.        Margaux Price, APRN  2025  09:37 EDT

## 2025-04-21 NOTE — THERAPY EVALUATION
Patient Name: Eladia Khan  : 2005    MRN: [id number]                              Today's Date: 2025       Admit Date: 2025    Visit Dx: No diagnosis found.  Patient Active Problem List   Diagnosis     (normal spontaneous vaginal delivery)     History reviewed. No pertinent past medical history.  Past Surgical History:   Procedure Laterality Date    WISDOM TOOTH EXTRACTION  2023      General Information       Row Name 25 1432          Physical Therapy Time and Intention    Document Type evaluation  -EJ     Mode of Treatment physical therapy  -EJ       Row Name 25 1432          General Information    Patient Profile Reviewed yes  -EJ     Prior Level of Function independent:  -EJ     Existing Precautions/Restrictions lifting  -EJ     Barriers to Rehab none identified  -EJ       Row Name 25 1432          Living Environment    Current Living Arrangements home  -EJ       Row Name 25 1432          Cognition    Orientation Status (Cognition) oriented x 4  -EJ       Row Name 25 1432          Safety Issues/Impairments Affecting Functional Mobility    Impairments Affecting Function (Mobility) pain  -EJ               User Key  (r) = Recorded By, (t) = Taken By, (c) = Cosigned By      Initials Name Provider Type    EJ Nicolle Temple, PT Physical Therapist                   Mobility       Row Name 25 1432          Bed Mobility    Bed Mobility bed mobility (all) activities  -EJ     All Activities, [place] (Bed Mobility) independent  -EJ       Row Name 25 1432          Sit-Stand Transfer    Sit-Stand [place] (Transfers) independent  -EJ       Row Name 25 1432          Gait/Stairs (Locomotion)    [place] Level (Gait) independent  -EJ     Patient was able to Ambulate yes  -EJ     Distance in Feet (Gait) 5  Up moving around in room.  Up ad belkis.  -EJ               User Key  (r) = Recorded By, (t) = Taken By, (c) = Cosigned By      Initials Name  Provider Type    Nicolle Reed, PT Physical Therapist                   Obj/Interventions       Row Name 25 1433          Range of Motion Comprehensive    General Range of Motion no range of motion deficits identified  -       Row Name 25 [facility]3          Strength Comprehensive (MMT)    Comment, General Manual Muscle Testing (MMT) Assessment Formal strength testing not performed this date due to recent delivery. Pt may benefit from pelvic floor/core strengthening/assessment once able.  -               User Key  (r) = Recorded By, (t) = Taken By, (c) = Cosigned By      Initials Name Provider Type    Nicolle Reed, PT Physical Therapist                   Goals/Plan    No documentation.                  Clinical Impression       Row Name 25 1433          Pain    Pretreatment Pain Rating 1/10  -EJ     Posttreatment Pain Rating 10  -EJ     Pain Side/Orientation bilateral;lower  -EJ     Pain Management Interventions other (see comments)  Education provided.  -       Row Name 25 1433          Plan of Care Review    Plan of Care Reviewed With patient  -EJ     Outcome Evaluation Patient is a 18 y/o F,, who came to [facility] 39w4d gestation.  She had a vaginal birth 25, with a 2nd degree laceration.  During today's evaluation PT provided pt with handout regarding postpartum healing post vaginal birth.  She demonstrated good understanding of material after education.  Provided pt with handout to keep, and contact information is present if pt has any additional PT needs/questions while in the hospital or post discharge.  -       Row Name 25 1433          Therapy Assessment/Plan (PT)    Criteria for Skilled Interventions Met (PT) no  -EJ     Therapy Frequency (PT) evaluation only  -EJ     Predicted Duration of Therapy Intervention (PT) discharge  -       Row Name 25 1433          Positioning and Restraints    Pre-Treatment Position standing in room  -EJ     Post  Treatment Position other  -EJ     Other Position --  Up standing in room.  -               User Key  (r) = Recorded By, (t) = Taken By, (c) = Cosigned By      Initials Name Provider Type    Nicolle Reed PT Physical Therapist                   Outcome Measures       Row Name 04/21/25 1435          How much help from another person do you currently need...    Turning from your back to your side while in flat bed without using bedrails? 4  -EJ     Moving from lying on back to sitting on the side of a flat bed without bedrails? 4  -EJ     Moving to and from a bed to a chair (including a wheelchair)? 4  -EJ     Standing up from a chair using your arms (e.g., wheelchair, bedside chair)? 4  -EJ     Climbing 3-5 steps with a railing? 4  -EJ     To walk in hospital room? 4  -EJ     AM-[facility] 6 Clicks Score (PT) 24  -EJ     Highest Level of Mobility Goal 8 --> Walked 250 feet or more  -       Row Name 04/21/25 1435          Functional Assessment    Outcome Measure Options AM-PAC 6 Clicks Basic Mobility (PT)  -               User Key  (r) = Recorded By, (t) = Taken By, (c) = Cosigned By      Initials Name Provider Type    Nicolle Reed, NIKITA Physical Therapist                                 Physical Therapy Education       Title: PT OT SLP Therapies (Done)       Topic: Physical Therapy (Done)       Point: Mobility training (Done)       Learning Progress Summary            Patient Acceptance, E,H, VU by NEVILLE at 4/21/2025 1436                      Point: Home exercise program (Done)       Learning Progress Summary            Patient Acceptance, E,H, VU by NEVILLE at 4/21/2025 1436                      Point: Body mechanics (Done)       Learning Progress Summary            Patient Acceptance, E,H, VU by NEVILLE at 4/21/2025 1436                      Point: Precautions (Done)       Learning Progress Summary            Patient Acceptance, E,H, VU by NEVILLE at 4/21/2025 1436                                      User Key       Initials  Effective Dates Name Provider Type Discipline     24 -  Nicolle Temple, PT Physical Therapist PT                  PT Recommendation and Plan     Outcome Evaluation: Patient is a 18 y/o F,, who came to [facility] 39w4d gestation.  She had a vaginal birth 25, with a 2nd degree laceration.  During today's evaluation PT provided pt with handout regarding postpartum healing post vaginal birth.  She demonstrated good understanding of material after education.  Provided pt with handout to keep, and contact information is present if pt has any additional PT needs/questions while in the hospital or post discharge.    Vaginal Patient education provided on:   -Body changes after pregnancy, grade of tear and what structures involved.   -Pelvic floor musculature, Transversus abdominus muscle  -Diaphragmatic breathing  -Proper kegel performance, as well as importance of relaxation   -LAURA  -Body mechanics   -Proper breathing/pressure management   -Toileting posture   -Benefits of exercise  -Basic/beginning exercise 0-2 weeks, 2-6 weeks, and after 6 weeks  -Postpartum safe stretches   -UI  -Perineal scar massage   -When it may be beneficial to see a Pelvic PT       Time Calculation:         PT Charges       Row Name 25 1436             Time Calculation    Start Time 1300  -EJ      Stop Time 1309  -EJ      Time Calculation (min) 9 min  -EJ      PT Received On 25  -EJ         Time Calculation- PT    Total Timed Code Minutes- PT 0 minute(s)  -EJ                User Key  (r) = Recorded By, (t) = Taken By, (c) = Cosigned By      Initials Name Provider Type     Nicolle Temple, PT Physical Therapist                  Therapy Charges for Today       Code Description Service Date Service Provider Modifiers Qty    [id number]  PT EVAL LOW COMPLEXITY 3 2025 Nicolle Temple, PT GP 1            PT G-Codes  Outcome Measure Options: AM-PAC 6 Clicks Basic Mobility (PT)  AM-PAC 6 Clicks Score (PT): 24  PT  Discharge Summary  Anticipated Discharge Disposition (PT): home    Nicolle Temple, PT  4/21/2025

## 2025-04-21 NOTE — LACTATION NOTE
This note was copied from a baby's chart.  Pt denies hx of breast surgery, no allergy to wool or foods. Medela gel patches & nipple skin ointment provided, instructed on use.   Pt has a Spectra pump. Plans to stay home with baby. Takes prenatal vitamins. Baby has had glucose imbalance. Glucose Gel was gives, assisted to position, demo wide latch, baby nursing a few minutes, instructed to pump pc with Spectra personal pump pc x 20 min for 10 ml, fed to baby,drop by drop due to no sucking to artificial nipple, followed by formula supplement, 5 ml consumed. Encouraged mother to offer baby the breast every two hours, and when baby shows feeding signs, instructed on washing pump parts after each use, will call for help as needed.

## 2025-04-21 NOTE — PLAN OF CARE
Goal Outcome Evaluation:  Plan of Care Reviewed With: patient           Outcome Evaluation: Patient is a 18 y/o F,, who came to [facility] 39w4d gestation.  She had a vaginal birth 25, with a 2nd degree laceration.  During today's evaluation PT provided pt with handout regarding postpartum healing post vaginal birth.  She demonstrated good understanding of material after education.  Provided pt with handout to keep, and contact information is present if pt has any additional PT needs/questions while in the hospital or post discharge.    Vaginal Patient education provided on:   -Body changes after pregnancy, grade of tear and what structures involved.   -Pelvic floor musculature, Transversus abdominus muscle  -Diaphragmatic breathing  -Proper kegel performance, as well as importance of relaxation   -LAURA  -Body mechanics   -Proper breathing/pressure management   -Toileting posture   -Benefits of exercise  -Basic/beginning exercise 0-2 weeks, 2-6 weeks, and after 6 weeks  -Postpartum safe stretches   -UI  -Perineal scar massage   -When it may be beneficial to see a Pelvic PT

## 2025-04-21 NOTE — PAYOR COMM NOTE
"AUTHORIZATION PENDING:   PLEASE CALL OR FAX DETERMINATION TO CONTACT BELOW. THANK YOU.        Anne-Marie Johnson RN MSN  /UR  [facility]  [phone] office  [phone] fax  [email or web address]    [facility]  NPI: [id number]  Tax: [phone]          Juliana Tabares (19 y.o. Female)       Date of Birth   2005    Social Security Number       Address   [address] [place] [address] [place] [facility]    Home Phone   [phone]    MRN   [id number]       Jew   None    Marital Status   Single                            Admission Date   2025    Admission Type   Elective    Admitting Provider   Elizabeth Mendenhall MD    Attending Provider   Elizabeth Mendenhall MD    Department, Room/Bed   [facility] MOTHER BABY, M418/1       Discharge Date       Discharge Disposition       Discharge Destination                                 Attending Provider: Elizabeth Mendenhall MD    Allergies: No Known Allergies    Isolation: None   Infection: None   Code Status: CPR    Ht: 157.5 cm (62\")   Wt: 59.1 kg (130 lb 3.2 oz)    Admission Cmt: None   Principal Problem: Pregnancy [Z34.90]                   Active Insurance as of 2025       Primary Coverage       Payor Plan Insurance Group Employer/Plan Group    [facility] [employer] [facility] [employer] [facility] PPO [id number]       Payor Plan Address Payor Plan Phone Number Payor Plan Fax Number Effective Dates    [address] [phone]  2022 - None Entered    [place] [address]         Subscriber Name Subscriber Birth Date Member ID       JULIANA TABARES 2005                      Emergency Contacts        (Rel.) Home Phone Work Phone Mobile Phone    ELYSSA TABARES (Mother) [phone] -- --          25 1023  Transfer Patient  Once     Completed     Level of Care: Mother/Baby    25 1023   25 1549  Admit To Obstetrics Inpatient  Once     Completed     Diagnosis: " "Pregnancy [522841]  Obstetrics Service: Labor and Delivery              History & Physical        AbdirashidElizabeth MD at 25 1552          [facility]  Obstetric History and Physical     Chief Complaint: Labor induction    Subjective    Patient is a 19 y.o. female  currently at 39 wks , who presents with scheduled labor induction.    Her prenatal care is benign.  Her previous obstetric/gynecological history is noted for is non-contributory.      Prenatal Information:  See office H&P for full details and labs.      Past OB History:       OB History    Para Term  AB Living   1 0 0 0 0 0   SAB IAB Ectopic Molar Multiple Live Births   0 0 0 0 0 0               Past Medical History: No past medical history on file.     Past Surgical History No past surgical history on file.     Family History: No family history on file.   Social History:  reports that she has never smoked. She has never used smokeless tobacco.   reports no history of alcohol use.   reports no history of drug use.        General ROS: Pertinent items are noted in HPI    Objective     Vitals:     Vitals:    25 1546   Weight: 63.3 kg (139 lb 8.8 oz)   Height: 157.5 cm (62\")       Fetal Heart Rate Assessment:   Category 1    [place]:   quiet     Physical Exam:     General Appearance:    Alert, cooperative, in no acute distress   Abdomen:     Soft, non-tender, no guarding, no rebound tenderness,       EFW 7#0oz - 7#8oz   Pelvic Exam:    Pelvis adequate.    Presentation: Vertex    Cervix: was checked (by me): closed cm / thick % / -2   Extremities:   Moves all extremities well, no edema, no cyanosis, no              redness   Skin:   No bleeding, bruising or rash   Neurologic:   No focal neurologic defect          Laboratory Results:   Lab Results (last 48 hours)       ** No results found for the last 48 hours. **               Assessment & Plan    Principal Problem:    Pregnancy         Assessment:  1.  Intrauterine pregnancy at 39 " wks gestation.    2.  Risk reducing IOL  3.  GBS status:Positive    Plan:  1. Cervical ripening with Cervidil. and PCN for GBS.   2. Plan of care has been reviewed with patient.  3.  Risks, benefits of treatment plan have been discussed.  4.  All questions have been answered.         Elizabeth Mendenhall MD   2025   15:52 EDT    Electronically signed by Elizabeth Mendenhall MD at 25 1557       H&P signed by [employer], [place] at 25 0810         [Media Unavailable] Scan on 2025 0800 by [employer], [employer]: [facility], 2024-2025          Electronically signed by [employer], [employer] at 25 0810          Operative/Procedure Notes (all)        Elizabeth Mendenhall MD at 25 1023  Version 1 of [address] [place] [address]  Vaginal Delivery Note    Diagnosis     Patient is a 19 y.o. female  currently at 39w4d, who presents with induction of labor.      Delivery     Delivery:  Spontaneous Vaginal Delivery    Date of Delivery:  2025   Anesthesia:  Epidural   Delivering clinician: Elizabeth Mendenhall MD      Delivery narrative:  Pt presented for IOL at term.  She was induced c cervidil and then amniotomy and pitocin.  She progressed slowly to active labor but once in active labor she progressed on a normal labor curve to complete.  She had a category 1 tracing throughout.  She pushed with excellent effort for just about 90 minutes (contractions were at times 5-6 minutes apart) to deliver a vigorous infant without difficulty.     Infant    Findings: VFI     Apgars: 9 & 9 at 1 and 5 minutes.      Placenta, Cord, and Fluid     Delayed cord clamping performed per routine.       Placenta delivered spontaneous, intact  3VC      Bimanual massage and Pitocin 30 units in 500 mL bolus given after placental delivery.  There was good hemostasis and a firm uterine tone.        Lacerations       had a 2nd degree laceration, repaired c 3.0 vicryl rapide in the usual fashion.     Quantitiative Blood Loss  300  mL     Sponge and needle counts correct x 2.     Disposition  Mother to Mother Baby/Postpartum  in stable condition currently.  Baby to remains with mom  in stable condition currently.      Elizabeth Mendenhall MD  25  10:23 EDT        Electronically signed by Elizabeth Mendenhall MD at 25 1024          Physician Progress Notes (all)        Margaux Price, APRN at 25 0937           [facility]  Postpartum Note    Subjective   Postpartum Day 1:  Spontaneous Vaginal Delivery    Patient without complaints. Her pain is well controlled with nonsteroidal anti-inflammatory drugs and prescribed pain medications. She is ambulating well.  Patient describes her bleeding as thin lochia. Pt voiding /s difficulty. Pt denies headache, visual changes, or RUQ pain.     Breastfeeding: infant latching without difficulty.    Objective     Vitals:  Vitals:    25 1608 25 1930 25 2250 25 0742   BP: 120/77 112/70 109/68 101/64   BP Location:  Right arm Right arm Left arm   Patient Position:  Sitting Sitting Sitting   Pulse: 88 86 100 78   Resp: 16 18 18 17   Temp: 97.8 °F (36.6 °C) 98.3 °F (36.8 °C) 98.1 °F (36.7 °C) 97.5 °F (36.4 °C)   TempSrc:  Oral Oral Oral   SpO2: 98% 98% 95% 99%   Weight:   59.1 kg (130 lb 3.2 oz)    Height:           Physical Exam:  General:  Alert and oriented x3. No acute distress.  Abdomen: abdomen is soft without significant tenderness, masses, organomegaly or guarding. Fundus: appropriate, firm, non tender  Incision: N/A  Skin: Warm, Dry  Extremities: Normal,  trace edema. Nontender     Labs:  Results from last 7 days   Lab Units 25  0450 25  1614   WBC 10*3/mm3 17.52* 12.15*   HEMOGLOBIN g/dL 8.9* 10.4*   HEMATOCRIT % 29.4* 30.6*   PLATELETS 10*3/mm3 144 182            Feeding method: Breastfeeding Status: Yes     Blood Type: RH Positive        Assessment & Plan     Active Problems:     (normal spontaneous vaginal  "delivery)  Anemia; Hgb 10.4 to 8.9    Eladia Khan is Day 1  post-partum from a  Spontaneous Vaginal Delivery      Plan:  routine, continue present management, and plan d/c tomorrow. FeSO4 bid.        JOSE Barnes  4/21/2025  09:37 EDT                    Electronically signed by Margaux Price APRN at 04/21/25 0938       Elizabeth Mendenhall MD at 04/19/25 2110          [facility]  Obstetric Progress Note    Subjective   Comfortable c epidural.     Objective     Vitals:  Vitals:    04/19/25 1905 04/19/25 1930 04/19/25 2000 04/19/25 2100   BP: 101/58 112/63 113/71 112/59   BP Location:   Left arm Left arm   Patient Position:   Lying Lying   Pulse: 102 80 81 118   Resp:   18    Temp:   98.4 °F (36.9 °C)    TempSrc:   Oral    SpO2: 100% 99% 98% 99%   Weight:       Height:         Flowsheet Rows      Flowsheet Row First Filed Value   Admission Height 157.5 cm (62\") Documented at 04/18/2025 1546   Admission Weight 63.3 kg (139 lb 8.8 oz) Documented at 04/18/2025 1546            Intake/Output Summary (Last 24 hours) at 4/19/2025 2110  Last data filed at 4/19/2025 1722  Gross per 24 hour   Intake --   Output 350 ml   Net -350 ml       Fetal Heart Rate Assessment:   Category 1  [place]:  q 3    Physical Exam:  General: Patient is in no acute distress    Pelvic Exam: was checked (by me): 4 cm / 90% % / -1            Assessment & Plan     Principal Problem:    Pregnancy         Assessment:  1.  Intrauterine pregnancy at 39w3d gestation.    2.  Risk reducing IOL  3.  GBS status: Positive    Plan:  1. Pitocin induction. and PCN for GBS.   2. Plan of care has been reviewed with patient.  3.  Risks, benefits of treatment plan have been discussed.  4.  All questions have been answered.        Elizabeth Mendenhall MD  4/19/2025  21:10 EDT      Electronically signed by Elizabeth Mendenhall MD at 04/19/25 2111       Elizabeth Mendenhall MD at 04/19/25 1114          [facility]  Obstetric Progress Note    Subjective   No complaints. " "    Objective     Vitals:  Vitals:    04/19/25 0300 04/19/25 0400 04/19/25 0500 04/19/25 0805   BP: 108/64 106/52 115/72 116/71   BP Location:    Left arm   Patient Position:    Lying   Pulse: 85 83 93 93   Resp:    16   Temp:  97.7 °F (36.5 °C)  97.4 °F (36.3 °C)   TempSrc:  Oral  Oral   SpO2: 96% 95% 97% 97%   Weight:       Height:         Flowsheet Rows      Flowsheet Row First Filed Value   Admission Height 157.5 cm (62\") Documented at 04/18/2025 1546   Admission Weight 63.3 kg (139 lb 8.8 oz) Documented at 04/18/2025 1546          No intake or output data in the 24 hours ending 04/19/25 1114    Fetal Heart Rate Assessment:   Category 1  [place]:  q 3    Physical Exam:  General: Patient is in no acute distress    Pelvic Exam: was checked (by me): 1 cm / 75% % / -2, AROM- Clear, and IUPC placed without difficulty            Assessment & Plan     Principal Problem:    Pregnancy         Assessment:  1.  Intrauterine pregnancy at 39w3d gestation.    2.  Risk reducing IOL  3.  GBS status: Positive    Plan:  1. Pitocin induction. and PCN for GBS.   2. Plan of care has been reviewed with patient.  3.  Risks, benefits of treatment plan have been discussed.  4.  All questions have been answered.        Elizabeth Mendenhall MD  4/19/2025  11:14 EDT      Electronically signed by Elizabeth Mendenhall MD at 04/19/25 1114       Consult Notes (all)    No notes of this type exist for this encounter.       "

## 2025-04-22 VITALS
DIASTOLIC BLOOD PRESSURE: 72 MMHG | SYSTOLIC BLOOD PRESSURE: 124 MMHG | TEMPERATURE: 98.4 F | WEIGHT: 129.41 LBS | HEART RATE: 77 BPM | RESPIRATION RATE: 16 BRPM | OXYGEN SATURATION: 99 % | HEIGHT: 62 IN | BODY MASS INDEX: 23.81 KG/M2

## 2025-04-22 RX ADMIN — DOCUSATE SODIUM 100 MG: 100 CAPSULE, LIQUID FILLED ORAL at 08:14

## 2025-04-22 RX ADMIN — Medication 1 G: at 08:13

## 2025-04-22 RX ADMIN — FERROUS SULFATE TAB 325 MG (65 MG ELEMENTAL FE) 325 MG: 325 (65 FE) TAB at 08:14

## 2025-04-22 RX ADMIN — WITCH HAZEL: 500 SOLUTION RECTAL; TOPICAL at 08:13

## 2025-04-22 RX ADMIN — PRENATAL VITAMINS-IRON FUMARATE 27 MG IRON-FOLIC ACID 0.8 MG TABLET 1 TABLET: at 08:14

## 2025-04-22 NOTE — DISCHARGE SUMMARY
[facility]  Delivery Discharge Summary    Primary OB Clinician: Elizabeth Mendenhall MD    Admission Diagnosis: TIUP; IOL  Active Problems:     (normal spontaneous vaginal delivery)      Discharge Diagnosis:  delivered    Gestational Age: 39w4d    Date of Delivery: 2025    Delivered By:  Elizabeth Mendenhall    Delivery Type: Vaginal, Spontaneous     Tubal Ligation: n/a    Intrapartum Course: Uncomplicated delivery.     Postpartum Course:  Pt was admitted and underwent  Spontaneous Vaginal Delivery. Pt was transferred to PP where she had an uncomplicated course. Pt remained AFVSS, had scant lochia and pain was well controlled. Pt d/c home in stable condition and will f/u in office for PP visit as scheduled or PRN. Currently both breast and bottle feeding. Plans on OCP  for contraception.     Physical Exam:    Vitals:   Vitals:    25 0742 25 1557 25 0014 25 0740   BP: 101/64 113/73 116/67 124/72   BP Location: Left arm Left arm Left arm Left arm   Patient Position: Sitting Sitting Sitting Sitting   Pulse: 78 64 97 77   Resp: 17 16 15 16   Temp: 97.5 °F (36.4 °C) 97.3 °F (36.3 °C) 97.6 °F (36.4 °C) 98.4 °F (36.9 °C)   TempSrc: Oral Oral Oral Oral   SpO2: 99% 100% 100% 99%   Weight:   58.7 kg (129 lb 6.6 oz)    Height:         Temp (24hrs), Av.8 °F (36.6 °C), Min:97.3 °F (36.3 °C), Max:98.4 °F (36.9 °C)      General Appearance:    Alert, cooperative, in no acute distress   Abdomen:     Soft non-tender, non-distended, no guarding, no rebound         tenderness.   Extremities:   Moves all extremities well, no edema, no cyanosis, no              Redness.   Incision:  N/A   Fundus:   Firm, below umbilicus     Feeding method: Breastfeeding Status: Yes    Labs:  Results from last 7 days   Lab Units 25  0450 25  1614   WBC 10*3/mm3 17.52* 12.15*   HEMOGLOBIN g/dL 8.9* 10.4*   HEMATOCRIT % 29.4* 30.6*   PLATELETS 10*3/mm3 144 182           Blood Type: RH  Positive      Plan:  Discharge to home.    Follow-up appointment with Dr Mendenhall in 6 weeks.   All discharge instructions were reviewed with pt including bleeding warnings, s/sx of PP depression, and warning signs in the PP period for which to seek medical attention including but not limited to s/sx of hypertension and thromboembolism.     Pippa Martínez, JOSE  4/22/2025  12:14 EDT

## 2025-04-22 NOTE — LACTATION NOTE
This note was copied from a baby's chart.  Pt visited, states she continues to work on improving latch & breastfeeding, has continued formula supplementing, encouraged to continue feeding at breast first, pump pc and feed baby any EBM to supplement with breast milk and d/c formula. Teaching complete. Plans d/c today, will follow up as needed.

## 2025-04-22 NOTE — PLAN OF CARE
Goal Outcome Evaluation:            Patient discharged home with baby in arms. All teaching complete and questions answered.

## 2025-04-23 ENCOUNTER — MATERNAL SCREENING (OUTPATIENT)
Dept: CALL CENTER | Facility: HOSPITAL | Age: 20
End: 2025-04-23
Payer: COMMERCIAL

## 2025-04-23 NOTE — PAYOR COMM NOTE
"NOTIFICATION OF DISCHARGE      AUTH # [id number]   Juliana Tabares (19 y.o. Female) 2005        DISCHARGED TO HOME ON 2025.      THANKS,              AUTHORIZATION PENDING:   PLEASE CALL OR FAX DETERMINATION TO CONTACT BELOW. THANK YOU.        Anne-Marie Johnson, RN MSN  /UR  [facility]  [phone] office  [phone] fax  [email or web address]    [facility]  NPI: [id number]  Tax: [phone]            Juliana Tabares (19 y.o. Female)       Date of Birth   2005    Social Security Number       Address   [address] [place] [address] [place] [facility]    Home Phone   [phone]    MRN   [id number]       Amish   None    Marital Status   Single                            Admission Date   2025    Admission Type   Elective    Admitting Provider   Elizabeth Mendenhall MD    Attending Provider       Department, Room/Bed   [facility] MOTHER BABY, M418/1       Discharge Date   2025    Discharge Disposition   Home or Self Care    Discharge Destination   Home                              Attending Provider: (none)   Allergies: No Known Allergies    Isolation: None   Infection: None   Code Status: Prior    Ht: 157.5 cm (62\")   Wt: 58.7 kg (129 lb 6.6 oz)    Admission Cmt: None   Principal Problem: Pregnancy [Z34.90]                   Active Insurance as of 2025       Primary Coverage       Payor Plan Insurance Group Employer/Plan Group    [facility] [employer] [facility] [employer] [facility] [id number]       Payor Plan Address Payor Plan Phone Number Payor Plan Fax Number Effective Dates    [address] [phone]  2022 - None Entered    [facility] [id number]         Subscriber Name Subscriber Birth Date Member ID       JULIANA TABARES 2005                      Emergency Contacts        (Rel.) Home Phone Work Phone Mobile Phone    ELYSSA TABARES (Mother) [phone] -- --                 Discharge Summary    "     Pippa Martínez APRN at 25 1214       Attestation signed by Elizabeth Mendenhall MD at 25 4424      I have reviewed this documentation and agree.                       [facility]  Delivery Discharge Summary    Primary OB Clinician: Elizabeth Mendenhall MD    Admission Diagnosis: TIUP; IOL  Active Problems:     (normal spontaneous vaginal delivery)      Discharge Diagnosis:  delivered    Gestational Age: 39w4d    Date of Delivery: 2025    Delivered By:  Elizabeth Mendenhall    Delivery Type: Vaginal, Spontaneous     Tubal Ligation: n/a    Intrapartum Course: Uncomplicated delivery.     Postpartum Course:  Pt was admitted and underwent  Spontaneous Vaginal Delivery. Pt was transferred to PP where she had an uncomplicated course. Pt remained AFVSS, had scant lochia and pain was well controlled. Pt d/c home in stable condition and will f/u in office for PP visit as scheduled or PRN. Currently both breast and bottle feeding. Plans on OCP  for contraception.     Physical Exam:    Vitals:   Vitals:    25 0742 25 1557 25 0014 25 0740   BP: 101/64 113/73 116/67 124/72   BP Location: Left arm Left arm Left arm Left arm   Patient Position: Sitting Sitting Sitting Sitting   Pulse: 78 64 97 77   Resp: 17 16 15 16   Temp: 97.5 °F (36.4 °C) 97.3 °F (36.3 °C) 97.6 °F (36.4 °C) 98.4 °F (36.9 °C)   TempSrc: Oral Oral Oral Oral   SpO2: 99% 100% 100% 99%   Weight:   58.7 kg (129 lb 6.6 oz)    Height:         Temp (24hrs), Av.8 °F (36.6 °C), Min:97.3 °F (36.3 °C), Max:98.4 °F (36.9 °C)      General Appearance:    Alert, cooperative, in no acute distress   Abdomen:     Soft non-tender, non-distended, no guarding, no rebound         tenderness.   Extremities:   Moves all extremities well, no edema, no cyanosis, no              Redness.   Incision:  N/A   Fundus:   Firm, below umbilicus     Feeding method: Breastfeeding Status: Yes    Labs:  Results from last 7 days   Lab Units 25  8800  04/18/25  1614   WBC 10*3/mm3 17.52* 12.15*   HEMOGLOBIN g/dL 8.9* 10.4*   HEMATOCRIT % 29.4* 30.6*   PLATELETS 10*3/mm3 144 182           Blood Type: RH Positive      Plan:  Discharge to home.    Follow-up appointment with Dr Cortés in 6 weeks.   All discharge instructions were reviewed with pt including bleeding warnings, s/sx of PP depression, and warning signs in the PP period for which to seek medical attention including but not limited to s/sx of hypertension and thromboembolism.     Pippa Martínez, APRN  4/22/2025  12:14 EDT      Electronically signed by Elizabeth Cortés MD at 04/22/25 1354       Discharge Order (From admission, onward)       Start     Ordered    04/22/25 1204  Discharge patient  Once        Expected Discharge Date: 04/22/25   Discharge Disposition: Home or Self Care   Physician of Record for Attribution - Please select from Treatment Team: ELIZABETH CORTÉS [[id number]]   Review needed by CMO to determine Physician of Record: No      Question Answer Comment   Physician of Record for Attribution - Please select from Treatment Team ELIZABETH CORTÉS    Review needed by CMO to determine Physician of Record No        04/22/25 1212

## 2025-04-23 NOTE — OUTREACH NOTE
Maternal Screening Survey      Flowsheet Row Responses   Eligibility Eligible   Prep survey completed? Yes   Facility patient discharged from? [facility] - Registered Nurse              [facility] - Registered Nurse

## 2025-05-01 ENCOUNTER — MATERNAL SCREENING (OUTPATIENT)
Dept: CALL CENTER | Facility: HOSPITAL | Age: 20
End: 2025-05-01
Payer: COMMERCIAL

## 2025-05-01 NOTE — OUTREACH NOTE
Maternal Screening Survey      Flowsheet Row Responses   Facility patient discharged from? [facility]   Attempt successful? No   Unsuccessful attempts Attempt 1              Abby DURÁN - Registered Nurse

## 2025-05-01 NOTE — OUTREACH NOTE
Maternal Screening Survey      Flowsheet Row Responses   Facility patient discharged from? [facility]   Attempt successful? No   Unsuccessful attempts Attempt 2              Abby DURÁN - Registered Nurse

## 2025-05-02 ENCOUNTER — MATERNAL SCREENING (OUTPATIENT)
Dept: CALL CENTER | Facility: HOSPITAL | Age: 20
End: 2025-05-02
Payer: COMMERCIAL

## 2025-05-02 NOTE — OUTREACH NOTE
Maternal Screening Survey      Flowsheet Row Responses   Facility patient discharged from? [facility]   Attempt successful? Yes   Call start time 940   Call end time 941   I have been able to laugh and see the funny side of things. 0   I have looked forward with enjoyment to things. 0   I have blamed myself unnecessarily when things went wrong. 0   I have been anxious or worried for no good reason. 0   I have felt scared or panicky for no good reason. 0   Things have been getting on top of me. 0   I have been so unhappy that I have had difficulty sleeping. 0   I have felt sad or miserable. 0   I have been so unhappy that I have been crying. 0   The thought of harming myself has occurred to me. 0   [place]  Depression Scale Total 0   Did any of your parents have problems with alcohol or drug use? No   Do any of your peers have problems with alcohol or drug use? No   Does your partner have problems with alcohol or drug use? No   Before you were pregnant did you have problems with alcohol or drug use? (past) No   In the past month, did you drink beer, wine, liquor or use any other drugs? (pregnancy) No   Maternal Screening call completed Yes   Call end time 941              Amy UPTON - Registered Nurse